# Patient Record
Sex: FEMALE | Race: WHITE
[De-identification: names, ages, dates, MRNs, and addresses within clinical notes are randomized per-mention and may not be internally consistent; named-entity substitution may affect disease eponyms.]

---

## 2018-05-02 ENCOUNTER — HOSPITAL ENCOUNTER (OUTPATIENT)
Dept: HOSPITAL 65 - RAD | Age: 23
Discharge: HOME | End: 2018-05-02
Attending: NURSE PRACTITIONER
Payer: COMMERCIAL

## 2018-05-02 DIAGNOSIS — R51: Primary | ICD-10-CM

## 2018-05-02 PROCEDURE — A9579 GAD-BASE MR CONTRAST NOS,1ML: HCPCS

## 2018-05-02 PROCEDURE — 70553 MRI BRAIN STEM W/O & W/DYE: CPT

## 2018-05-02 NOTE — DIREP
PROCEDURE:MRI BRAIN W&W/O

 

COMPARISON:None.

 

INDICATIONS:R51 HEADACHE

 

TECHNIQUE:A variety of imaging planes and parameters were utilized for 

visualization of suspected pathology.  Images were performed without and with 

gadolinium contrast.

 

FINDINGS:

VENTRICLES:Normal.

CEREBRUM:Normal.

CEREBELLUM:Normal.

BRAINSTEM:Normal.

BASAL CISTERNS:Normal.

 

HEMORRHAGE:No

MASS LESION:No

ACUTE INFARCT:No

 

SKULL:Normal.

OTHER:Negative.  The paranasal sinuses are clear.

 

CONCLUSION:Normal examination.  

 

 

 

Dictated by: Trve Purdy MD on 05/02/2018 at 04:46 PM     

Electronically Signed By: Trev Purdy MD on 05/02/2018 at 04:49 PM

## 2019-04-07 ENCOUNTER — HOSPITAL ENCOUNTER (EMERGENCY)
Dept: HOSPITAL 65 - ER | Age: 24
Discharge: HOME | End: 2019-04-07
Payer: COMMERCIAL

## 2019-04-07 VITALS — WEIGHT: 160 LBS | HEIGHT: 67 IN | BODY MASS INDEX: 25.11 KG/M2

## 2019-04-07 VITALS — SYSTOLIC BLOOD PRESSURE: 131 MMHG | DIASTOLIC BLOOD PRESSURE: 100 MMHG

## 2019-04-07 VITALS — DIASTOLIC BLOOD PRESSURE: 74 MMHG | SYSTOLIC BLOOD PRESSURE: 111 MMHG

## 2019-04-07 DIAGNOSIS — R07.89: Primary | ICD-10-CM

## 2019-04-07 LAB
ALP INTEST CFR SERPL: 76 U/L (ref 50–136)
ALT SERPL-CCNC: 22 U/L (ref 12–78)
AST SERPL-CCNC: 34 U/L (ref 0–35)
BASOPHILS # BLD AUTO: 0 10^3/UL (ref 0–0.1)
BASOPHILS NFR BLD AUTO: 0.3 % (ref 0–0.2)
CALCIUM SERPL-MCNC: 8.9 MG/DL (ref 8.4–10.5)
CO2 BLDA-SCNC: 25 MMOL/L (ref 20–32)
EOSINOPHIL # BLD AUTO: 0 10^3/UL (ref 0–0.2)
EOSINOPHIL NFR BLD AUTO: 0.2 % (ref 0–5)
ERYTHROCYTE [DISTWIDTH] IN BLOOD BY AUTOMATED COUNT: 14.3 % (ref 11.5–14.5)
GLUCOSE PRE 100 G GLC PO SERPL-MCNC: 91 MG/DL (ref 70–110)
HGB BLD-MCNC: 12.6 G/DL (ref 12–15)
LYMPHOCYTES # BLD AUTO: 1.5 10^3/UL (ref 1–4.8)
LYMPHOCYTES NFR BLD AUTO: 13.6 % (ref 24–44)
MCH RBC QN AUTO: 27 PG (ref 26–34)
MCHC RBC AUTO-ENTMCNC: 33.3 G/DL (ref 33–37)
MCV RBC AUTO: 81.1 FL (ref 78–100)
MONOCYTES # BLD AUTO: 0.5 10^3/UL (ref 0.3–0.8)
MONOCYTES NFR BLD AUTO: 4.5 % (ref 5–12)
NEUTROPHILS # BLD AUTO: 9.1 10^3/UL (ref 1.8–7.7)
NEUTROPHILS NFR BLD AUTO: 81.2 % (ref 41–85)
PLATELET # BLD AUTO: 313 10^3/UL (ref 150–400)
PMV BLD AUTO: 10 FL (ref 7.8–11)
WBC # BLD AUTO: 11.2 10^3/UL (ref 4.5–11)

## 2019-04-07 PROCEDURE — 80053 COMPREHEN METABOLIC PANEL: CPT

## 2019-04-07 PROCEDURE — 99284 EMERGENCY DEPT VISIT MOD MDM: CPT

## 2019-04-07 PROCEDURE — 82550 ASSAY OF CK (CPK): CPT

## 2019-04-07 PROCEDURE — 84484 ASSAY OF TROPONIN QUANT: CPT

## 2019-04-07 PROCEDURE — 93005 ELECTROCARDIOGRAM TRACING: CPT

## 2019-04-07 PROCEDURE — 36415 COLL VENOUS BLD VENIPUNCTURE: CPT

## 2019-04-07 PROCEDURE — 71045 X-RAY EXAM CHEST 1 VIEW: CPT

## 2019-04-07 PROCEDURE — 85025 COMPLETE CBC W/AUTO DIFF WBC: CPT

## 2019-04-07 NOTE — ER.PDOC
General


Chief Complaint:  Chest Pain-Cardiac Nature


Stated Complaint:  CHEST PAIN,HIGH PULSE


Time seen by MD:  18:20


Source:  patient


Exam Limitations:  no limitations





History of Present Illness


Initial Comments


CP started this morning, mainly left sided, worse with inspiration


Timing/Duration:  24 hours


Severity/Quality:  mild


Radiation:  no radiation


Activities at Onset:  none


Prior CP/Workup:  No Prior Chest Pain


Nitro Today/Relief:  No Nitro Taken Today


Aspirin Today:  No Aspirin Today


Associated Symptoms:  denies symptoms


Allergies:  


Coded Allergies:  


     No Known Allergies (Unverified , 5/2/18)





Past Medical History


Medical History:  no pertinent history


Surgical History:  no surgical history


LMP (females 10-50):  last week





Social History


Smoking:  non-smoker


Alcohol Use:  none


Drug Use:  none





Cardiovascular:  see HPI


All Other Systems:  Reviewed and Negative





Physical Exam


General Appearance:  No Apparent Distress, WD/WN


HEENT:  PERRL/EOMI, Normal ENT Inspection, TMs Normal, Pharynx Normal


Neck:  Non-Tender, Full Range of Motion, Supple, Normal Inspection


Respiratory:  chest non-tender, lungs clear, normal breath sounds, no 

respiratory distress, no accessory muscle use


Cardiovascular:  Normal Peripheral Pulses, Regular Rate, Rhythm, No Edema, No 

Gallop, No JVD, No Murmur


Gastrointestinal:  Normal Bowel Sounds, No Organomegaly, No Pulsatile Mass, Non 

Tender, Soft


Extremities:  Normal Range of Motion, Non-Tender, Normal Inspection, No Pedal 

Edema, No Calf Tenderness, Normal Capillary Refill


Neurologic/Psychiatric:  CNs II-XII NML as Tested, No Motor/Sensory Deficits, 

Alert, Normal Mood/Affect, Oriented x 3


Skin:  Normal Color, Warm/Dry


Lymphatic:  No Adenopathy





Results/Orders


Results/Orders





Orders - BASILICO,LEOPOLDO M MD


Cbc With Auto Diff (4/7/19 18:25)


Comprehensive Metabolic Panel (4/7/19 18:25)


Creatine Kinase (4/7/19 18:25)


Troponin I (4/7/19 18:25)


Xr Chest 1v (4/7/19 18:25)


Ekg-Routine (4/7/19 18:25)





Vital Signs








  Date Time  Temp Pulse Resp B/P (MAP) Pulse Ox O2 Delivery O2 Flow Rate FiO2


 


4/7/19 18:17 98.0 110 18 131/100 (110) 98 Room Air  





 98.0       


 


4/7/19 18:11 98.4 93 18  98 Room Air  





 98.4       


 


4/7/19 18:11 98.0 110 18     





 98.0       











Departure


Time of Disposition:  19:17


Disposition:  01 HOME, SELF-CARE


Impression:  


 Primary Impression:  


 Tenderness of chest wall


 Additional Impression:  


 Chest pain


Condition:  Stable


Patient Instructions:  Chest Wall Pain, Easy-to-Read


Referrals:  


PARKER GARAY NP (PCP)


PRIMARY CARE PROVIDER


Duration or Time Spent with Pa:  20





Problem Qualifiers











BASILICO,LEOPOLDO M MD Apr 7, 2019 18:27

## 2019-04-07 NOTE — DIREP
PROCEDURE:CHEST 1 VIEW

 

COMPARISON:None.

 

INDICATIONS:CP

 

FINDINGS:

LUNGS/PLEURA:No significant pulmonary parenchymal abnormalities. No effusions.

VASCULATURE:Normal.  Unremarkable pulmonary vasculature.

CARDIAC:Normal.  No cardiac silhouette abnormality or cardiomegaly. 

MEDIASTINUM:Normal.  No visible mass or adenopathy. 

BONES:Normal.  No fracture or visible bony lesion. 

OTHER:Monitor leads overlie the chest.

 

CONCLUSION:No active or acute cardiopulmonary disease is seen.

 

 

 

Dictated by: Edward Ornelas M.D. on 04/07/2019 at 07:07 PM     

Electronically Signed By: Edward Ornelas M.D. on 04/07/2019 at 07:08 PM

## 2019-04-07 NOTE — PCM.EKG
[facility]

                                       

Test Date:    2019               Test Time:    18:17:39

Pat Name:     CECILIO VELIZ               Department:   

Patient ID:   [facility]-[id number]          Room:          

Gender:       F                        Technician:   RT

:          1995               Requested By: LEOPOLDO BASILICO

Order Number: [id number]           Reading MD:   Leopoldo Basilico

                                 Measurements

Intervals                              Axis          

Rate:         91                       P:            73

KS:           142                      QRS:          75

QRSD:         74                       T:            53

QT:           350                                    

QTc:          430                                    

                           Interpretive Statements

Normal sinus rhythm

Normal ECG

No previous ECG available for comparison



Electronically Signed On 2019 23:26:07 CDT by Leopoldo Basilico



Please click the below link to view image of tracing.